# Patient Record
Sex: MALE | Race: WHITE
[De-identification: names, ages, dates, MRNs, and addresses within clinical notes are randomized per-mention and may not be internally consistent; named-entity substitution may affect disease eponyms.]

---

## 2018-03-06 ENCOUNTER — HOSPITAL ENCOUNTER (OUTPATIENT)
Dept: HOSPITAL 76 - RT.N | Age: 63
Discharge: HOME | End: 2018-03-06
Attending: NURSE PRACTITIONER
Payer: MEDICAID

## 2018-03-06 DIAGNOSIS — R07.89: Primary | ICD-10-CM

## 2018-03-06 DIAGNOSIS — I10: ICD-10-CM

## 2018-03-06 PROCEDURE — 93005 ELECTROCARDIOGRAM TRACING: CPT

## 2018-04-04 ENCOUNTER — HOSPITAL ENCOUNTER (OUTPATIENT)
Dept: HOSPITAL 76 - EMS | Age: 63
Discharge: ANOTHER HOSPITAL | End: 2018-04-04
Attending: SURGERY
Payer: MEDICAID

## 2018-04-04 ENCOUNTER — HOSPITAL ENCOUNTER (OUTPATIENT)
Dept: HOSPITAL 76 - EMS | Age: 63
Discharge: HOME | End: 2018-04-04
Attending: SURGERY
Payer: MEDICAID

## 2018-04-04 ENCOUNTER — HOSPITAL ENCOUNTER (EMERGENCY)
Dept: HOSPITAL 76 - ED | Age: 63
Discharge: ANOTHER HOSPITAL | End: 2018-04-04
Payer: MEDICAID

## 2018-04-04 VITALS — DIASTOLIC BLOOD PRESSURE: 66 MMHG | SYSTOLIC BLOOD PRESSURE: 133 MMHG
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
## 2018-04-04 DIAGNOSIS — G40.909: Primary | ICD-10-CM
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
## 2018-04-04 DIAGNOSIS — M54.5: ICD-10-CM
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
## 2018-04-04 DIAGNOSIS — M54.6: ICD-10-CM

## 2018-04-04 DIAGNOSIS — S02.402A: ICD-10-CM

## 2018-04-04 DIAGNOSIS — S22.39XA: ICD-10-CM

## 2018-04-04 DIAGNOSIS — S01.111A: ICD-10-CM

## 2018-04-04 DIAGNOSIS — S82.831A: ICD-10-CM

## 2018-04-04 DIAGNOSIS — S22.31XA: ICD-10-CM

## 2018-04-04 DIAGNOSIS — Z23: ICD-10-CM

## 2018-04-04 DIAGNOSIS — S01.81XA: Primary | ICD-10-CM

## 2018-04-04 DIAGNOSIS — S02.92XA: Primary | ICD-10-CM

## 2018-04-04 PROCEDURE — 99284 EMERGENCY DEPT VISIT MOD MDM: CPT

## 2018-04-04 PROCEDURE — 12011 RPR F/E/E/N/L/M 2.5 CM/<: CPT

## 2018-04-04 PROCEDURE — 72125 CT NECK SPINE W/O DYE: CPT

## 2018-04-04 PROCEDURE — 70450 CT HEAD/BRAIN W/O DYE: CPT

## 2018-04-04 PROCEDURE — 72100 X-RAY EXAM L-S SPINE 2/3 VWS: CPT

## 2018-04-04 PROCEDURE — 90471 IMMUNIZATION ADMIN: CPT

## 2018-04-04 PROCEDURE — 71046 X-RAY EXAM CHEST 2 VIEWS: CPT

## 2018-04-04 PROCEDURE — 70486 CT MAXILLOFACIAL W/O DYE: CPT

## 2018-04-04 NOTE — XRAY PRELIMINARY REPORT
Accession: [id number]

Exam: XR SHOULDER 3 VIEW RT

 

IMPRESSION: Right second rib fracture.

 

[facility]

 

SITE ID: [id number]

## 2018-04-04 NOTE — XRAY PRELIMINARY REPORT
Accession: [id number]

Exam: XR LUMBAR SPINE 2 VIEW

 

IMPRESSION: 

1. No acute bony abnormality. 

2. Old moderate wedging thoracolumbar junction with diffuse idiopathic skeletal hyperostosis.

 

[facility]

 

SITE ID: [id number]
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
## 2018-04-04 NOTE — XRAY PRELIMINARY REPORT
Accession: [id number]

Exam: XR TIB/FIB RT

 

IMPRESSION: Proximal fibular fracture.

 

[facility]

 

SITE ID: [id number]
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
## 2018-04-04 NOTE — XRAY PRELIMINARY REPORT
Accession: [id number]

Exam: XR HIP W/PELVIS 2-3V RT

 

IMPRESSION: 

1. No acute bony abnormality. 

2. Mild degenerative changes both hips.

 

[facility]

 

SITE ID: [id number]

## 2018-04-04 NOTE — CT PRELIMINARY REPORT
Accession: [id number]

Exam: CT CERVICAL SPINE W/O

 

IMPRESSION: 

1. No acute abnormality of the cervical spine with degenerative spondylosis as above.

2. Minimally displaced right styloid process fracture.

 

[facility]

 

SITE ID: [id number]
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
## 2018-04-04 NOTE — CT PRELIMINARY REPORT
Accession: [id number]

Exam: CT HEAD W/O

 

IMPRESSION: No acute intracranial abnormality. Please refer to separately dictated facial CT for desc
ription of facial fracture.

 

[facility]

 

SITE ID: [id number]
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
## 2018-04-04 NOTE — CT PRELIMINARY REPORT
Accession: [id number]

Exam: CT FACIAL BONES W/O

 

IMPRESSION: 

1. Findings most consistent with a right zygomaticomaxillary complex fracture (tripod fracture), mini
anthony displaced with sutural widening, though minimally depressed segmental zygomatic arch fracture i
s a component of this.

2. No evidence of intraorbital abnormality or herniated contents through the floor of the right orbit
 at the site of minimally displaced orbital floor fracture.

3. Minimally displaced right styloid process fracture.

 

[facility]

 

The above findings  were discussed with Dr. Solis by Dr. Dayne Steve at 16:37 hrs on 4/4/18.

 

SITE ID: [id number]

## 2018-05-25 ENCOUNTER — HOSPITAL ENCOUNTER (OUTPATIENT)
Dept: HOSPITAL 76 - DI.N | Age: 63
Discharge: HOME | End: 2018-05-25
Attending: FAMILY MEDICINE
Payer: MEDICAID

## 2018-05-25 DIAGNOSIS — M25.40: Primary | ICD-10-CM

## 2018-05-25 NOTE — XRAY REPORT
TWO VIEW RIGHT FOREARM:  05/25/2018

 

CLINICAL INDICATION:  Swelling.

 

FINDINGS:  Frontal and lateral views of the right forearm demonstrate no evidence of fracture. 

No foreign body is seen.

 

IMPRESSION:  NORMAL RIGHT FOREARM.

 

 

DD: 05/25/2018 15:20

TD: 05/25/2018 15:23

Job #: [id number]

## 2018-05-25 NOTE — XRAY REPORT
THREE-VIEW BILATERAL HANDS:  05/25/2018

 

CLINICAL INDICATION:  Joint swelling.

 

FINDINGS:  AP, lateral, and oblique views of the bilateral hands demonstrate no evidence of 

fracture or dislocation.  The joint spaces are preserved.  No radiopaque foreign body is seen 

in the soft tissues.

 

IMPRESSION:  NORMAL BILATERAL HANDS.

 

 

DD: 05/25/2018 15:19

TD: 05/25/2018 15:24

Job #: [id number]

## 2018-09-27 ENCOUNTER — HOSPITAL ENCOUNTER (OUTPATIENT)
Dept: HOSPITAL 76 - DI.N | Age: 63
Discharge: HOME | End: 2018-09-27
Attending: NURSE PRACTITIONER
Payer: MEDICAID

## 2018-09-27 ENCOUNTER — HOSPITAL ENCOUNTER (OUTPATIENT)
Dept: HOSPITAL 76 - RT.N | Age: 63
Discharge: HOME | End: 2018-09-27
Attending: NURSE PRACTITIONER
Payer: MEDICAID

## 2018-09-27 DIAGNOSIS — M79.601: Primary | ICD-10-CM

## 2018-09-27 DIAGNOSIS — R42: Primary | ICD-10-CM

## 2018-09-27 PROCEDURE — 93005 ELECTROCARDIOGRAM TRACING: CPT
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
## 2018-11-28 ENCOUNTER — HOSPITAL ENCOUNTER (EMERGENCY)
Dept: HOSPITAL 76 - ED | Age: 63
Discharge: HOME | End: 2018-11-28
Payer: MEDICAID

## 2018-11-28 VITALS — DIASTOLIC BLOOD PRESSURE: 89 MMHG | SYSTOLIC BLOOD PRESSURE: 148 MMHG

## 2018-11-28 DIAGNOSIS — I44.7: ICD-10-CM

## 2018-11-28 DIAGNOSIS — E78.00: ICD-10-CM

## 2018-11-28 DIAGNOSIS — Z95.5: ICD-10-CM

## 2018-11-28 DIAGNOSIS — I25.118: Primary | ICD-10-CM

## 2018-11-28 LAB
ALBUMIN DIAFP-MCNC: 4.5 G/DL (ref 3.2–5.5)
ALBUMIN/GLOB SERPL: 1.6 {RATIO} (ref 1–2.2)
ALP SERPL-CCNC: 61 IU/L (ref 42–121)
ALT SERPL W P-5'-P-CCNC: 18 IU/L (ref 10–60)
ANION GAP SERPL CALCULATED.4IONS-SCNC: 7 MMOL/L (ref 6–13)
AST SERPL W P-5'-P-CCNC: 26 IU/L (ref 10–42)
BASOPHILS NFR BLD AUTO: 0.1 10^3/UL (ref 0–0.1)
BASOPHILS NFR BLD AUTO: 1 %
BILIRUB BLD-MCNC: 0.5 MG/DL (ref 0.2–1)
BUN SERPL-MCNC: 23 MG/DL (ref 6–20)
CALCIUM UR-MCNC: 9 MG/DL (ref 8.5–10.3)
CHLORIDE SERPL-SCNC: 104 MMOL/L (ref 101–111)
CO2 SERPL-SCNC: 26 MMOL/L (ref 21–32)
CREAT SERPLBLD-SCNC: 1 MG/DL (ref 0.6–1.2)
EOSINOPHIL # BLD AUTO: 0.4 10^3/UL (ref 0–0.7)
EOSINOPHIL NFR BLD AUTO: 6.8 %
ERYTHROCYTE [DISTWIDTH] IN BLOOD BY AUTOMATED COUNT: 12.6 % (ref 12–15)
GFRSERPLBLD MDRD-ARVRAT: 76 ML/MIN/{1.73_M2} (ref 89–?)
GLOBULIN SER-MCNC: 2.9 G/DL (ref 2.1–4.2)
GLUCOSE SERPL-MCNC: 95 MG/DL (ref 70–100)
HGB UR QL STRIP: 15.5 G/DL (ref 14–18)
LIPASE SERPL-CCNC: 43 U/L (ref 22–51)
LYMPHOCYTES # SPEC AUTO: 1.1 10^3/UL (ref 1.5–3.5)
LYMPHOCYTES NFR BLD AUTO: 20.1 %
MAGNESIUM SERPL-MCNC: 2.2 MG/DL (ref 1.7–2.8)
MCH RBC QN AUTO: 32.8 PG (ref 27–31)
MCHC RBC AUTO-ENTMCNC: 35.2 G/DL (ref 32–36)
MCV RBC AUTO: 93.3 FL (ref 80–94)
MONOCYTES # BLD AUTO: 0.7 10^3/UL (ref 0–1)
MONOCYTES NFR BLD AUTO: 13.7 %
NEUTROPHILS # BLD AUTO: 3.1 10^3/UL (ref 1.5–6.6)
NEUTROPHILS # SNV AUTO: 5.2 X10^3/UL (ref 4.8–10.8)
NEUTROPHILS NFR BLD AUTO: 58.4 %
PDW BLD AUTO: 8.1 FL (ref 7.4–11.4)
PLATELET # BLD: 201 10^3/UL (ref 130–450)
PROT SPEC-MCNC: 7.4 G/DL (ref 6.7–8.2)
RBC MAR: 4.72 10^6/UL (ref 4.7–6.1)
SODIUM SERPLBLD-SCNC: 137 MMOL/L (ref 135–145)

## 2018-11-28 PROCEDURE — 80053 COMPREHEN METABOLIC PANEL: CPT

## 2018-11-28 PROCEDURE — 99284 EMERGENCY DEPT VISIT MOD MDM: CPT

## 2018-11-28 PROCEDURE — 96374 THER/PROPH/DIAG INJ IV PUSH: CPT

## 2018-11-28 PROCEDURE — 84484 ASSAY OF TROPONIN QUANT: CPT

## 2018-11-28 PROCEDURE — 93005 ELECTROCARDIOGRAM TRACING: CPT

## 2018-11-28 PROCEDURE — 83690 ASSAY OF LIPASE: CPT

## 2018-11-28 PROCEDURE — 83735 ASSAY OF MAGNESIUM: CPT

## 2018-11-28 PROCEDURE — 85025 COMPLETE CBC W/AUTO DIFF WBC: CPT

## 2018-11-28 PROCEDURE — 83880 ASSAY OF NATRIURETIC PEPTIDE: CPT

## 2018-11-28 PROCEDURE — 36415 COLL VENOUS BLD VENIPUNCTURE: CPT

## 2018-11-28 NOTE — ED PHYSICIAN DOCUMENTATION
PD HPI CHEST PAIN





- Stated complaint


Stated Complaint: CHEST PX





- Chief complaint


Chief Complaint: Cardiac





- History obtained from


History obtained from: Patient





- History of Present Illness


Timing - onset: Today


Timing - onset during: Exertion (The patient had heart stents in July and had 

been doing cardiac rehab at a lower level.  He had increased his activity to 5-6

METs the last 2 sessions and has had a little bit of chest pain with both of 

those.  He also noted some chest pain with walking up inclined hill this past 

week.  He has not had any discomfort with light activity.He was in cardiac rehab

today when he developed some exertional discomfort in the chest and his 

telemetry showed a change from a narrow complex to a bundle branch and then 

returned back to narrow complex.  He was sent to the ER.  They did not contact 

his cardiologist to head.)


Timing - details: Abrupt onset, Now resolved (resolved with stopping the 

exercise.)


Quality: Tightness, Aching


Location: Substernal


Associated symptoms: No: Shortness of air, Diaphoresis, Nausea, Feeling faint / 

dizzy, Palpitations


Similar symptoms before: Diagnosis (angina prior to heart stents in July)


Recently seen: Clinic (cardiac rehab)





Review of Systems


Constitutional: denies: Fever, Chills


Nose: denies: Rhinorrhea / runny nose, Congestion


Throat: denies: Sore throat


Cardiac: reports: Chest pain / pressure (with exertion the past 1-2 weeks).  

denies: Palpitations, Pedal edema, Calf pain


Respiratory: denies: Dyspnea, Cough


GI: denies: Abdominal Pain, Nausea, Vomiting, Diarrhea


Skin: denies: Rash, Lesions


Neurologic: denies: Generalized weakness, Focal weakness, Numbness, Near syncope





PD PAST MEDICAL HISTORY





- Past Medical History


Cardiovascular: High cholesterol





- Past Surgical History


Past Surgical History: No





- Present Medications


Home Medications: 


                                Ambulatory Orders











 Medication  Instructions  Recorded  Confirmed


 


Levetiracetam [Keppra] 2 tab PO BID 04/04/18 04/04/18


 


Lisinopril 0 mg PO DAILY 04/04/18 04/04/18


 


Pravastatin [Pravachol] 0 mg QPM 04/04/18 04/04/18


 


carBAMazepine ER [TEGretol XR] TID 04/04/18 


 


Amlodipine Besylate [Norvasc] 10 mg PO DAILY #20 tablet 11/28/18 


 


Nitroglycerin 0.4 mg SL PRN PRN #1 bottle 11/28/18 














- Allergies


Allergies/Adverse Reactions: 


                                    Allergies











Allergy/AdvReac Type Severity Reaction Status Date / Time


 


dextroamphetamine Allergy  Edema Verified 11/28/18 14:33





[From Dexedrine]     


 


Penicillins Allergy  Unknown Verified 11/28/18 14:33














- Social History


Does the pt smoke?: No


Smoking Status: Never smoker


Does the pt drink ETOH?: No


Does the pt have substance abuse?: No





- Immunizations


Immunizations are current?: No


Immunizations: TDAP >10years/unknown





PD ED PE NORMAL





- Vitals


Vital signs reviewed: Yes





- General


General: Alert and oriented X 3, No acute distress, Well developed/nourished





- HEENT


HEENT: Pharynx benign





- Neck


Neck: Supple, no meningeal sign, No adenopathy





- Cardiac


Cardiac: RRR, No murmur





- Respiratory


Respiratory: Clear bilaterally





- Abdomen


Abdomen: Soft, Non tender





- Derm


Derm: Normal color, Warm and dry





- Extremities


Extremities: No deformity, No tenderness to palpate, Normal ROM s pain, No 

edema, No calf tenderness / cord





- Neuro


Neuro: Alert and oriented X 3, No motor deficit, Normal speech





Results





- Vitals


Vitals: 


                               Vital Signs - 24 hr











  11/28/18 11/28/18 11/28/18





  14:31 15:12 15:30


 


Temperature 36.7 C  


 


Heart Rate 85 57 L 58 L


 


Respiratory 16 12 15





Rate   


 


Blood Pressure 184/101 H 161/90 H 148/89 H


 


O2 Saturation 97 98 














  11/28/18 11/28/18





  16:00 16:30


 


Temperature  


 


Heart Rate 62 59 L


 


Respiratory 18 13





Rate  


 


Blood Pressure  


 


O2 Saturation 97 97








                                     Oxygen











O2 Source                      Room air

















- EKG (time done)


  ** 14:34


Rate: Rate (enter#) (79)


Rhythm: NSR


Axis: Normal


Intervals: Normal OH


QRS: Normal


Ischemia: Normal ST segments.  No: ST elevation c/w ischemia, ST depression





- Labs


Labs: 


                                Laboratory Tests











  11/28/18 11/28/18 11/28/18





  11:45 14:45 14:45


 


WBC  5.2  


 


RBC  4.72  


 


Hgb  15.5  


 


Hct  44.0  


 


MCV  93.3  


 


MCH  32.8 H  


 


MCHC  35.2  


 


RDW  12.6  


 


Plt Count  201  


 


MPV  8.1  


 


Neut # (Auto)  3.1  


 


Lymph # (Auto)  1.1 L  


 


Mono # (Auto)  0.7  


 


Eos # (Auto)  0.4  


 


Baso # (Auto)  0.1  


 


Absolute Nucleated RBC  0.00  


 


Nucleated RBC %  0.0  


 


Sodium   137 


 


Potassium   4.1 


 


Chloride   104 


 


Carbon Dioxide   26 


 


Anion Gap   7.0 


 


BUN   23 H 


 


Creatinine   1.0 


 


Estimated GFR (MDRD)   76 L 


 


Glucose   95 


 


Calcium   9.0 


 


Magnesium   2.2 


 


Total Bilirubin   0.5 


 


AST   26 


 


ALT   18 


 


Alkaline Phosphatase   61 


 


Troponin I    < 0.04


 


B-Natriuretic Peptide   


 


Total Protein   7.4 


 


Albumin   4.5 


 


Globulin   2.9 


 


Albumin/Globulin Ratio   1.6 


 


Lipase   43 














  11/28/18





  14:45


 


WBC 


 


RBC 


 


Hgb 


 


Hct 


 


MCV 


 


MCH 


 


MCHC 


 


RDW 


 


Plt Count 


 


MPV 


 


Neut # (Auto) 


 


Lymph # (Auto) 


 


Mono # (Auto) 


 


Eos # (Auto) 


 


Baso # (Auto) 


 


Absolute Nucleated RBC 


 


Nucleated RBC % 


 


Sodium 


 


Potassium 


 


Chloride 


 


Carbon Dioxide 


 


Anion Gap 


 


BUN 


 


Creatinine 


 


Estimated GFR (MDRD) 


 


Glucose 


 


Calcium 


 


Magnesium 


 


Total Bilirubin 


 


AST 


 


ALT 


 


Alkaline Phosphatase 


 


Troponin I 


 


B-Natriuretic Peptide  8


 


Total Protein 


 


Albumin 


 


Globulin 


 


Albumin/Globulin Ratio 


 


Lipase 














PD MEDICAL DECISION MAKING





- ED course


Complexity details: reviewed results, considered differential (He had 

exertionally related chest discomfort which improved with rest.  This sounds 

like stable angina subsequent to his heart caths so that is concerning if baby 

is having restenosis or new area.  His telemetry from cardiac rehab showed an 

intermittent brief bundle branch block.  I talked with cardiology on-call who 

said he does have a history of a left bundle intermittently.  This does not need

any particular attention.  We will prescribe the patient some Norvasc and 

nitroglycerin as needed and have him follow-up with cardiology.), d/w patient, 

d/w consultant (Cradiology on call for Dr. Disla - directed start Norvasc (since

resting heart rate 60, not to do beta blocker), and NTG PRN. F/U with 

Cardiology. )





Departure





- Departure


Disposition: 01 Home, Self Care


Clinical Impression: 


 Coronary artery disease with exertional angina, Bundle branch block, left





Condition: Stable


Record reviewed to determine appropriate education?: Yes


Instructions:  ED Chest Pain Angina Stable


Follow-Up: 


Mihaela Blackwell ARNP [Primary Care Provider] - 


Ryan Macario MD [Physician No Access] - 


Prescriptions: 


Amlodipine Besylate [Norvasc] 10 mg PO DAILY #20 tablet


Nitroglycerin 0.4 mg SL PRN PRN #1 bottle


 PRN Reason: Chest Pain


Comments: 


Continue your other medications.  Add Norvasc 10 mg daily to help reduce your 

blood pressure and help with blood flow to the heart.  Light activity is okay 

but do not do activity to the level that causes chest discomfort.  You can 

continue to cardiac rehab about a low level until follow-up.  He is 

nitroglycerin under the tongue if needed for chest pain that is not relieved by 

rest.  Call the cardiology office tomorrow for follow-up appointment which will 

likely be for next week.  They will want to do some heart testing to evaluate 

the patency of the stents for such.  Return if persistent chest pain not 

relieved by rest or nitroglycerin.


Discharge Date/Time: 11/28/18 16:50

## 2018-12-28 ENCOUNTER — HOSPITAL ENCOUNTER (EMERGENCY)
Dept: HOSPITAL 76 - ED | Age: 63
Discharge: HOME | End: 2018-12-28
Payer: MEDICAID

## 2018-12-28 VITALS — SYSTOLIC BLOOD PRESSURE: 170 MMHG | DIASTOLIC BLOOD PRESSURE: 87 MMHG

## 2018-12-28 DIAGNOSIS — L30.9: Primary | ICD-10-CM

## 2018-12-28 DIAGNOSIS — E78.00: ICD-10-CM

## 2018-12-28 DIAGNOSIS — Z95.5: ICD-10-CM

## 2018-12-28 PROCEDURE — 99283 EMERGENCY DEPT VISIT LOW MDM: CPT

## 2018-12-28 NOTE — ED PHYSICIAN DOCUMENTATION
PD HPI SKIN





- Stated complaint


Stated Complaint: RASH ON HIP





- Chief complaint


Chief Complaint: Wound





- History obtained from


History obtained from: Patient





- History of Present Illness


Timing - onset: How many days ago (5)


Timing - duration: Days (5)


Timing - details: Gradual onset


Pain level max: 0


Pain level now: 0


Quality / character: Itchy.  No: Painful, Burning, Discolored, Raised, 

Vesicular, Crusted, Swelling, Draining


Improved by: Other (nothing)


Worsened by (comment): COMMENT (nothing)


Associated symptoms: No: Fever, Myalgias, Joint pain, Headache, Facial swelling,

Dyspnea, Abd pain, N/V/D, Urinary sx


Contributing factors: No: Exposed to medication, Exposed to food, Exposed to 

soap / lotion, Exposed to Poison ivy/oak, Insect bite /sting, Recent illness


Similar symptoms before: Has not had sx before


Recently seen: Not recently seen





- Additional information


Additional information: 





Patient with a rash to the right side of his trunk and hip for the past 5 days. 

It is itchy.





Review of Systems


Constitutional: denies: Fever, Chills


GI: denies: Vomiting


Musculoskeletal: denies: Neck pain, Back pain


Neurologic: denies: Headache





PD PAST MEDICAL HISTORY





- Past Medical History


Cardiovascular: High cholesterol


Neuro: Seizure disorder





- Past Surgical History


Past Surgical History: No


Cardiovascular: Coronary stent





- Present Medications


Home Medications: 


                                Ambulatory Orders











 Medication  Instructions  Recorded  Confirmed


 


Levetiracetam [Keppra] 2 tab PO BID 04/04/18 04/04/18


 


Lisinopril 5 mg PO DAILY 04/04/18 04/04/18


 


carBAMazepine ER [TEGretol XR] TID 04/04/18 


 


Amlodipine Besylate [Norvasc] 10 mg PO DAILY #20 tablet 11/28/18 


 


Nitroglycerin 0.4 mg SL PRN PRN #1 bottle 11/28/18 


 


Aspirin Chewable [[place]  12/28/18 





Aspirin]   


 


Atorvastatin Calcium  12/28/18 


 


Calcium Carbonate [Calcium]  12/28/18 


 


Clopidogrel [Plavix]  12/28/18 


 


Isosorbide Dinitrate  12/28/18 


 


Lacosamide [Vimpat]  12/28/18 


 


predniSONE [Prednisone] 40 mg PO DAILY #10 tablet 12/28/18 














- Allergies


Allergies/Adverse Reactions: 


                                    Allergies











Allergy/AdvReac Type Severity Reaction Status Date / Time


 


dextroamphetamine Allergy  Edema Verified 12/28/18 11:46





[From Dexedrine]     


 


Penicillins Allergy  Unknown Verified 12/28/18 11:46














- Social History


Does the pt smoke?: No


Smoking Status: Never smoker


Does the pt drink ETOH?: No


Does the pt have substance abuse?: No





- Immunizations


Immunizations are current?: No


Immunizations: TDAP >10years/unknown





PD ED PE NORMAL





- Vitals


Vital signs reviewed: Yes





- General


General: Alert and oriented X 3, No acute distress





- Derm


Derm: Warm and dry





- Extremities


Extremities: Other (2 small areas of papular exanthem to the right trunk, one is

approximately 2 x 3 cm, the other is 3 x 3 cm.  Blanches easily.)





- Neuro


Neuro: Alert and oriented X 3





Results





- Vitals


Vitals: 





                               Vital Signs - 24 hr











  12/28/18





  11:40


 


Temperature 36.0 C L


 


Heart Rate 60


 


Respiratory 16





Rate 


 


Blood Pressure 170/87 H


 


O2 Saturation 97








                                     Oxygen











O2 Source                      Room air

















PD MEDICAL DECISION MAKING





- ED course


Complexity details: considered differential, d/w patient


ED course: 





63-year-old male with a nonspecific dermatitis.  No evidence of infection.  Will

place on steroids.  We will have him follow-up with his doctor.  Patient 

counseled regarding signs and symptoms for which I believe and urgent re-

evaluation would be necessary. Patient with good understanding of and agreement 

to plan and is comfortable going home at this time





This document was made in part using voice recognition software. While efforts 

are made to proofread this document, sound alike and grammatical errors may 

occur.





Departure





- Departure


Disposition: 01 Home, Self Care


Clinical Impression: 


 Dermatitis





Condition: Good


Instructions:  ED Dermatitis Non Specific Rash


Follow-Up: 


Mihaela Blackwell ARNP [Primary Care Provider] - Within 1 week (for recheck)


Prescriptions: 


predniSONE [Prednisone] 40 mg PO DAILY #10 tablet


Comments: 


Use the steroids as prescribed.  Return if you worsen.  Follow-up with your 

doctor for further care.  The cause of your rash is unclear today.

## 2019-01-22 ENCOUNTER — HOSPITAL ENCOUNTER (EMERGENCY)
Dept: HOSPITAL 76 - ED | Age: 64
Discharge: HOME | End: 2019-01-22
Payer: MEDICAID

## 2019-01-22 VITALS — SYSTOLIC BLOOD PRESSURE: 160 MMHG | DIASTOLIC BLOOD PRESSURE: 91 MMHG

## 2019-01-22 DIAGNOSIS — Z79.01: ICD-10-CM

## 2019-01-22 DIAGNOSIS — Z95.5: ICD-10-CM

## 2019-01-22 DIAGNOSIS — Z79.82: ICD-10-CM

## 2019-01-22 DIAGNOSIS — B35.4: Primary | ICD-10-CM

## 2019-01-22 DIAGNOSIS — E78.00: ICD-10-CM

## 2019-01-22 PROCEDURE — 99283 EMERGENCY DEPT VISIT LOW MDM: CPT

## 2019-01-22 NOTE — ED PHYSICIAN DOCUMENTATION
PD HPI SKIN





- Stated complaint


Stated Complaint: RASH ON BACK/LEFT FORE ARM





- Chief complaint


Chief Complaint: Wound





- History obtained from


History obtained from: Patient





- History of Present Illness


Timing - onset: Other (Very itchy all over for the last 2 weeks and has noticed 

a rash in that time on his back and now on the left forearm.  He is never had 

this before.  Has never seen a dermatologist for any reason.  He denies joint 

aches.  No fevers chills or weight loss.)





Review of Systems


Constitutional: denies: Fever, Chills


Respiratory: denies: Dyspnea, Cough


GI: denies: Abdominal Pain, Nausea





PD PAST MEDICAL HISTORY





- Past Medical History


Cardiovascular: High cholesterol


Respiratory: None


Neuro: Seizure disorder


Endocrine/Autoimmune: None


GI: None


: None


Psych: None


Musculoskeletal: None


Derm: None





- Past Surgical History


Past Surgical History: Yes


Cardiovascular: Coronary stent





- Present Medications


Home Medications: 


                                Ambulatory Orders











 Medication  Instructions  Recorded  Confirmed


 


Levetiracetam [Keppra] 2 tab PO BID 04/04/18 04/04/18


 


Lisinopril 5 mg PO DAILY 04/04/18 04/04/18


 


carBAMazepine ER [TEGretol XR] TID 04/04/18 


 


Amlodipine Besylate [Norvasc] 10 mg PO DAILY #20 tablet 11/28/18 


 


Nitroglycerin 0.4 mg SL PRN PRN #1 bottle 11/28/18 


 


Aspirin Chewable [[place]  12/28/18 





Aspirin]   


 


Atorvastatin Calcium 80 mg QPM 12/28/18 


 


Calcium Carbonate [Calcium] 1 tab DAILY 12/28/18 


 


Clopidogrel [Plavix] 75 mg DAILY 12/28/18 


 


Isosorbide Dinitrate 30 mg DAILY 12/28/18 


 


Lacosamide [Vimpat] 50 mg QPM 12/28/18 


 


Clotrimazole/Betamethasone Dip 1 appful TP TID #3 cream..g. 01/22/19 





[Clotrimazole-Betamethasone Crm]   


 


hydrOXYzine pamoate [Hydroxyzine 1 - 2 tab PO Q6H PRN #20 capsule 01/22/19 





Pamoate]   














- Allergies


Allergies/Adverse Reactions: 


                                    Allergies











Allergy/AdvReac Type Severity Reaction Status Date / Time


 


dextroamphetamine Allergy  Edema Verified 01/22/19 11:13





[From Dexedrine]     


 


Penicillins Allergy  Unknown Verified 01/22/19 11:13














- Social History


Does the pt smoke?: No


Smoking Status: Never smoker


Does the pt drink ETOH?: No


Does the pt have substance abuse?: No





- Immunizations


Immunizations are current?: No


Immunizations: TDAP >10years/unknown





- POLST


Patient has POLST: No





PD ED PE NORMAL





- Vitals


Vital signs reviewed: Yes





- General


General: Alert and oriented X 3, No acute distress





- Derm


Derm: Other (There is a macular rash especially on the back but also a spot on 

the left forearm with slight central clearing, this is more likely consistent wi

th tinea and less likely pityriasis rosea.)





- Neuro


Neuro: Alert and oriented X 3, Normal speech





Results





- Vitals


Vitals: 





                               Vital Signs - 24 hr











  01/22/19





  11:04


 


Temperature 36.3 C L


 


Heart Rate 66


 


Respiratory 18





Rate 


 


Blood Pressure 149/96 H


 


O2 Saturation 98








                                     Oxygen











O2 Source                      Room air

















Departure





- Departure


Disposition: 01 Home, Self Care


Clinical Impression: 


 Tinea corporis





Condition: Good


Record reviewed to determine appropriate education?: Yes


Instructions:  ED Infec Skin Fungal Tinea


Follow-Up: 


[facility] [Provider Group]


Prescriptions: 


Clotrimazole/Betamethasone Dip [Clotrimazole-Betamethasone Crm] 1 appful TP TID 

#3 cream..g.


hydrOXYzine pamoate [Hydroxyzine Pamoate] 1 - 2 tab PO Q6H PRN #20 capsule


 PRN Reason: Itching


Comments: 


As discussed, I think this most likely represents a fungal infection which 

should be treated by the cream I am giving you.  That said other possibilities 

include pityriasis rosea.  Either way he should follow-up with a dermatologist 

for definitive diagnosis and treatment.  Call the number on this form.

## 2019-03-15 ENCOUNTER — HOSPITAL ENCOUNTER (OUTPATIENT)
Dept: HOSPITAL 76 - LAB.N | Age: 64
Discharge: HOME | End: 2019-03-15
Payer: MEDICAID

## 2019-03-15 DIAGNOSIS — G40.219: Primary | ICD-10-CM

## 2019-03-15 LAB — CARBAMAZEPINE SERPL-MSCNC: 9.8 UG/ML

## 2019-03-15 PROCEDURE — 80156 ASSAY CARBAMAZEPINE TOTAL: CPT

## 2019-03-15 PROCEDURE — 36415 COLL VENOUS BLD VENIPUNCTURE: CPT

## 2020-04-24 ENCOUNTER — HOSPITAL ENCOUNTER (OUTPATIENT)
Dept: HOSPITAL 76 - LAB | Age: 65
Discharge: HOME | End: 2020-04-24
Attending: INTERNAL MEDICINE
Payer: COMMERCIAL

## 2020-04-24 DIAGNOSIS — I10: Primary | ICD-10-CM

## 2020-04-24 DIAGNOSIS — E78.2: ICD-10-CM

## 2020-04-24 DIAGNOSIS — Z95.5: ICD-10-CM

## 2020-04-24 LAB
ANION GAP SERPL CALCULATED.4IONS-SCNC: 8 MMOL/L (ref 6–13)
BASOPHILS NFR BLD AUTO: 0.1 10^3/UL (ref 0–0.1)
BASOPHILS NFR BLD AUTO: 0.7 %
BUN SERPL-MCNC: 27 MG/DL (ref 6–20)
CALCIUM UR-MCNC: 9.4 MG/DL (ref 8.5–10.3)
CHLORIDE SERPL-SCNC: 107 MMOL/L (ref 101–111)
CHOLEST SERPL-MCNC: 129 MG/DL
CO2 SERPL-SCNC: 27 MMOL/L (ref 21–32)
CREAT SERPLBLD-SCNC: 1.3 MG/DL (ref 0.6–1.2)
EOSINOPHIL # BLD AUTO: 0.3 10^3/UL (ref 0–0.7)
EOSINOPHIL NFR BLD AUTO: 4.5 %
ERYTHROCYTE [DISTWIDTH] IN BLOOD BY AUTOMATED COUNT: 11.8 % (ref 12–15)
GLUCOSE SERPL-MCNC: 106 MG/DL (ref 70–100)
HDLC SERPL-MCNC: 49 MG/DL
HDLC SERPL: 2.6 {RATIO} (ref ?–5)
HGB UR QL STRIP: 14.7 G/DL (ref 14–18)
LDLC SERPL CALC-MCNC: 70 MG/DL
LDLC/HDLC SERPL: 1.4 {RATIO} (ref ?–3.6)
LYMPHOCYTES # SPEC AUTO: 1.1 10^3/UL (ref 1.5–3.5)
LYMPHOCYTES NFR BLD AUTO: 14.6 %
MCH RBC QN AUTO: 33 PG (ref 27–31)
MCHC RBC AUTO-ENTMCNC: 34.8 G/DL (ref 32–36)
MCV RBC AUTO: 94.6 FL (ref 80–94)
MONOCYTES # BLD AUTO: 0.7 10^3/UL (ref 0–1)
MONOCYTES NFR BLD AUTO: 9.4 %
NEUTROPHILS # BLD AUTO: 5.2 10^3/UL (ref 1.5–6.6)
NEUTROPHILS # SNV AUTO: 7.3 X10^3/UL (ref 4.8–10.8)
NEUTROPHILS NFR BLD AUTO: 70.3 %
PDW BLD AUTO: 9.8 FL (ref 7.4–11.4)
PLATELET # BLD: 178 10^3/UL (ref 130–450)
RBC MAR: 4.46 10^6/UL (ref 4.7–6.1)
SODIUM SERPLBLD-SCNC: 142 MMOL/L (ref 135–145)
VLDLC SERPL-SCNC: 10 MG/DL

## 2020-04-24 PROCEDURE — 36415 COLL VENOUS BLD VENIPUNCTURE: CPT

## 2020-04-24 PROCEDURE — 80048 BASIC METABOLIC PNL TOTAL CA: CPT

## 2020-04-24 PROCEDURE — 80061 LIPID PANEL: CPT

## 2020-04-24 PROCEDURE — 85025 COMPLETE CBC W/AUTO DIFF WBC: CPT

## 2020-04-24 PROCEDURE — 83721 ASSAY OF BLOOD LIPOPROTEIN: CPT

## 2021-01-07 ENCOUNTER — HOSPITAL ENCOUNTER (EMERGENCY)
Dept: HOSPITAL 76 - ED | Age: 66
Discharge: HOME | End: 2021-01-07
Payer: MEDICARE

## 2021-01-07 VITALS — DIASTOLIC BLOOD PRESSURE: 66 MMHG | SYSTOLIC BLOOD PRESSURE: 100 MMHG

## 2021-01-07 DIAGNOSIS — Z79.82: ICD-10-CM

## 2021-01-07 DIAGNOSIS — M79.662: Primary | ICD-10-CM

## 2021-01-07 PROCEDURE — 99283 EMERGENCY DEPT VISIT LOW MDM: CPT

## 2021-01-07 NOTE — XRAY REPORT
PROCEDURE:  Tib/Fib LT

 

INDICATIONS:  pain

 

TECHNIQUE:  2 views of the tibia and fibula were acquired.  

 

COMPARISON:  None

 

FINDINGS:  

 

Bones:  No fractures or dislocations.  No suspicious bony lesions.  

 

Soft tissues:  No suspicious soft tissue calcifications or masses.  

 

IMPRESSION:  

No left lower leg fracture or dislocation. No gross soft tissue abnormality. No finding to explain pa
tient's symptoms.

 

Reviewed by: Sergey Renteria MD on 1/7/2021 11:23 AM PST

Approved by: Sergey Renteria MD on 1/7/2021 11:23 AM PST

 

 

Station ID:  [id number]

## 2021-01-07 NOTE — ED PHYSICIAN DOCUMENTATION
History of Present Illness





- Stated complaint


Stated Complaint: L LEG PX





- Chief complaint


Chief Complaint: Ext Problem





- History obtained from


History obtained from: Patient





- Additonal information


Additional information: 


Patient comes emergency department for chief complaint of left shin pain.  He 

states that he noticed a tender spot in his mid anterior tibial area on the left

and that he has a "very high pain tolerance", so if he notices any pain, 

something is "very wrong".  Patient denies trauma to the area.  No fevers or 

chills.  No redness or swelling.  He states that the area of tenderness is very 

specific.  No history of DVT, though the patient is concerned about this 

possibility.  No cancer history.  No other complaints at this time.





Review of Systems


Ten Systems: 10 systems reviewed and negative


Constitutional: reports: Reviewed and negative


Eyes: reports: Reviewed and negative


Ears: reports: Reviewed and negative


Nose: reports: Reviewed and negative


Throat: reports: Reviewed and negative


Cardiac: reports: Reviewed and negative


Respiratory: reports: Reviewed and negative


GI: reports: Reviewed and negative


: reports: Reviewed and negative


Skin: reports: Reviewed and negative


Musculoskeletal: reports: Extremity pain.  denies: Joint pain, Extremity 

swelling, Joint swelling, Pain with weight bearing


Neurologic: reports: Reviewed and negative


Psychiatric: reports: Reviewed and negative


Endocrine: reports: Reviewed and negative


Immunocompromised: reports: Reviewed and negative





PD PAST MEDICAL HISTORY





- Past Medical History


Past Medical History: Yes


Cardiovascular: High cholesterol


Respiratory: None


Neuro: Seizure disorder


Endocrine/Autoimmune: None


GI: None


: None


Psych: None


Musculoskeletal: None


Derm: None





- Past Surgical History


Past Surgical History: Yes


Cardiovascular: Coronary stent


Neuro: Other





- Present Medications


Home Medications: 


                                Ambulatory Orders











 Medication  Instructions  Recorded  Confirmed


 


Levetiracetam [Keppra] 1,500 mg PO BID 04/04/18 04/04/18


 


lisinopriL [Lisinopril] 10 mg PO QPM 04/04/18 04/04/18


 


Amlodipine Besylate [Norvasc] 10 mg PO DAILY #20 tablet 11/28/18 


 


Nitroglycerin 0.4 mg SL PRN PRN #1 bottle 11/28/18 


 


Aspirin Chewable [[place] 81 mg DAILY 12/28/18 





Aspirin]   


 


Atorvastatin Calcium 80 mg QPM 12/28/18 


 


Calcium Carbonate [Calcium] 1 tab DAILY 12/28/18 


 


Ezetimibe [Zetia] 10 mg DAILY 01/07/21 01/07/21


 


Isosorbide Mononitrate [Isosorbide 120 mg DAILY 01/07/21 01/07/21





Mononitrate ER]   


 


Ranolazine [Ranolazine ER] 500 mg BID 01/07/21 01/07/21


 


Rosuvastatin Calcium [Crestor] 40 mg QPM 01/07/21 01/07/21


 


Zonisamide [Zonegran] 50 mg QPM 01/07/21 01/07/21


 


Zonisamide [Zonegran] 200 mg QPM 01/07/21 01/07/21


 


carvediloL [Coreg] 3.125 mg BID 01/07/21 01/07/21














- Allergies


Allergies/Adverse Reactions: 


                                    Allergies











Allergy/AdvReac Type Severity Reaction Status Date / Time


 


dextroamphetamine Allergy  Edema Verified 01/22/19 11:13





[From Dexedrine]     


 


Penicillins Allergy  Unknown Verified 01/22/19 11:13


 


phenobarbital Allergy  Unknown Verified 01/07/21 10:25














- Social History


Does the pt smoke?: No


Smoking Status: Never smoker


Does the pt drink ETOH?: No


Does the pt have substance abuse?: No





- Immunizations


Immunizations are current?: No


Immunizations: TDAP >10years/unknown





- POLST


Patient has POLST: No





PD ED PE NORMAL





- Vitals


Vital signs reviewed: Yes





- General


General: Alert and oriented X 3, No acute distress, Well developed/nourished





- HEENT


HEENT: Atraumatic, PERRL, EOMI, Moist mucous membranes





- Neck


Neck: Supple, no meningeal sign





- Cardiac


Cardiac: Strong equal pulses





- Respiratory


Respiratory: No respiratory distress





- Derm


Derm: Normal color, Warm and dry, No rash, Other (No left lower extremity 

erythema. No contusion.)





- Extremities


Extremities: No deformity, Normal ROM s pain, No edema, No calf tenderness / 

cord, Other (Patient has an approximately 2 cm diameter area of mild tenderness 

over his left anterior tibial area over the bone.  He has circled it with a pen.

 There is no swelling, mass, or other enlargement of the area.  The skin appears

completely normal on the outside.  No calf tenderness.  )





- Neuro


Neuro: Alert and oriented X 3, No motor deficit, No sensory deficit





- Psych


Psych: Normal mood, Normal affect





Results





- Vitals


Vitals: 


                                     Oxygen











O2 Source                      Room air

















- Rads (name of study)


  ** L tib-fib XR


Radiology: Final report received, EMP read indepedently, See rad report (neg)





PD MEDICAL DECISION MAKING





- ED course


Complexity details: reviewed results, re-evaluated patient, considered 

differential, d/w patient


ED course: 


The patient's physical exam was unremarkable other than a very focused area of 

tenderness which mainly involve the bone itself.  Overall I felt this was very 

low risk but the patient was insistent that he does not feel any pain unless 

something is "very wrong".  I explained to the patient why I did not feel he had

a DVT, I also explained that there is no evidence of infection.  I sent him for 

an x-ray to evaluate for a pathologic lesion of the bone, and this was negative.

 The patient has no evidence of soft tissue trauma overlying the tibial area, at

this point, I have advised the patient that I find absolutely no evidence of a 

serious or emergent condition.  I have discussed with him that if he has further

concerns about this focused area of mild pain and tenderness then he will need 

to follow-up with his primary care physician.  We have discussed the usual 

indications for return.








Departure





- Departure


Disposition: 01 Home, Self Care


Clinical Impression: 


Pain in extremity


Qualifiers:


 Extremity pain location: lower leg Laterality: left Qualified Code(s): M79.662 

- Pain in left lower leg





Condition: Stable


Comments: 


Other than mild tenderness, your leg looks completely normal.  No evidence of a 

blood clot as you have no tenderness or swelling in the calf muscle, and your 

pain and tenderness are entirely over the bone.  Your bone looks good on x-ray. 

There is no evidence of a break or cancerous process.  There is no evidence of 

infection in the soft tissues of your leg.  There is also no evidence of trauma.

 Is not clear why you have the mild tenderness over the focused area of your 

bone, but this should get better on its own in time.


Discharge Date/Time: 01/07/21 11:56

## 2021-01-13 NOTE — EXTERNAL MEDICAL SUMMARY RPT
Continuity of Care Document

                           Created on:2021



Patient:VELVET SYKES

Sex:Male

:1955

External Reference #:[id number]





Demographics







                          Phone                     Unavailable

 

                          Preferred Language        English

 

                          Marital Status            Unknown

 

                          Hindu Affiliation     Unknown

 

                          Race                      Unknown

 

                          Ethnic Group              Unknown









Author







                          Organization              [facility]

 

                          [place] [address]

 

                          Phone                     [phone]









Support







                Name            Relationship    Address         Phone

 

                [place]        Unavailable     Unavailable     Unavailable









Care Team Providers







                    Name                Role                Phone

 

                    Holiday             Unavailable         Unavailable

 

                    ARNP                Unavailable         Unavailable









Problems







                     date                description         facility

 

                     2020 05:48    Localization-related (focal)  [facility]



                                        (partial) symptomatic epilepsy 



                                        and epileptic syndromes with 



                                        complex partial seizures, 



                                        intractable, without status 



                                        epilepticus         

 

                     2020 00:00:00  Localization-related (focal)  [facility]



                                        (partial) epilepsy and [facility]



                                        epileptic syndromes with simple 



                                        partial seizures, without 



                                        mention of intractable epilepsy 

 

                     2020 00:00:00  Localization-related (focal)  [facility]



                                        (partial) symptomatic epilepsy [facility]



                                        and epileptic syndromes with 



                                        simple partial seizures, not 



                                        intractable, without status 



                                        epilepticus         

 

                     2020 00:00:00  Localization[facility]related(focal)(pa  [facility]

[facility]



                                        rtial)idiopathic epilepsy and [facility]



                                        epileptic syndromes with 



                                        seizures of localized onset 







Allergies







                     date                description         facility

 

                                         CHLORHEXIDINE       [facility]

[facility]

 

                                         IODINATED CONTRAST MEDIA  [facility]

 

                                         LATEX               [facility]

 

                                         METRIZAMIDE         [facility]

 

                                         DILTIAZEM           [facility]

 

                                         IODINE              [facility]

[facility]

 

                                         BEE VENOM           [facility]

[facility]

 

                                         CIPROFLOXACIN       [facility]

 

                                         CODEINE             [facility]

[facility]

 

                                         HYDROCODONE         [facility]

 

                                         INFLIXIMAB          [facility]

 

                                         MORPHINE            [facility]

 

                                         PREDNISONE          [facility]

 

                                         SIMVASTATIN         [facility]

 

                                         HYDROCODONE-ACETAMINOPHEN  [facility]

 

                                         NSAIDS              [facility]

[facility]

 

                                         PENICILLINS         [facility]

[facility]

 

                                         TETANUS TOXOIDS     [facility]

 

                                         shellfish           [facility]

 

                                         IODINE              [facility]

[facility]

 

                                         SHELLFISH-DERIVED PRODUCTS  [facility]

 

                                         ASPIRIN             [facility]

 

                                         METOCLOPRAMIDE HCL  [facility]

[facility]

 

                                         HYDROCHLOROTHIAZIDE  [facility]

 

                                         TRIAMTERENE         [facility]

 

                                         IBUPROFEN           [facility]

[facility]

 

                                         SULFAMETHOXAZOLE    [facility]

[facility]

 

                                         TRAMADOL            [facility]

[facility]

 

                                         PROMETHAZINE        [facility]

[facility]

 

                                         cyclobenzaprine HCl *  [facility]

[facility]

 

                                         pseudoephedrine HCl *  [facility]

 

                                         cefazolin sodium *  [facility]

[facility]

 

                                         Penicillins         [facility]

[facility]

 

                                         Sulfa (Sulfonamide Antibiotics)  [facility]

[facility]

 

                                         lisinopril          [facility]

 

                                         iodine              [facility]

 

                                         acetaminophen       [facility]

 

                                         dextroamphetamine   [facility]

 

                                         hydrochlorothiazide  [facility]

 

                                         terfenadine         [facility]

 

                                         cimetidine          [facility]

 

                                         famotidine          [facility]

 

                                         omeprazole          [facility]

 

                                         rabeprazole         [facility]

 

                                         red dye             [facility]

 

                                         PENICILLIN V POTASSIUM  [facility]

[facility]







Medications







                     date                description         facility

 

                     2020 00:00:00  null                [facility]

 

                     2020 00:00:00  null                [facility]

 

                     2020 00:00:00  null                [facility]

 

                     2020 00:00:00  null                [facility]

[facility]

 

                     2020 00:00:00  null                [facility]

[facility]

 

                     2020 00:00:00  null                [facility]

[facility]

 

                     2020 00:00:00  null                [facility]

[facility]

 

                     2020 00:00:00  null                [facility]

[facility]

 

                     2020 00:00:00  null                [facility]

[facility]

 

                     2020 00:00:00  null                [facility]

[facility]

 

                     2020 00:00:00  DEXAMETHASONE       [facility]

[facility]

 

                     2020 00:00:00  ERYTHROMYCIN        [facility]

[facility]

 

                     2020 00:00:00  ROSUVASTATIN CALCIUM  [facility]

[facility]

 

                     2020 00:00:00  NITROGLYCERIN       [facility]

[facility]

 

                     2020 00:00:00  ZONISAMIDE          [facility]

[facility]

 

                     2020 00:00:00  DEXAMETHASONE       [facility]

[facility]

 

                     2020 00:00:00  NITROGLYCERIN       [facility]

[facility]

 

                     2020 00:00:00  ERYTHROMYCIN        [facility]

[facility]

 

                     2020 00:00:00  ZONISAMIDE          [facility]

[facility]

 

                     2020 00:00:00  ROSUVASTATIN CALCIUM  [facility]

[facility]

 

                     2021 00:00:00  null                [facility]

[facility]

 

                     2021 00:00:00  null                [facility]

[facility]

 

                     2021 00:00:00  ISOSORBIDE MONONITRATE  [facility]

 

                     2021 00:00:00  ISOSORBIDE MONONITRATE  [facility]







Social History







                     date                description         facility

 

                     [id number]

## 2021-04-19 ENCOUNTER — HOSPITAL ENCOUNTER (OUTPATIENT)
Dept: HOSPITAL 76 - LAB.WCP | Age: 66
Discharge: HOME | End: 2021-04-19
Attending: FAMILY MEDICINE
Payer: MEDICARE

## 2021-04-19 DIAGNOSIS — R10.32: Primary | ICD-10-CM

## 2021-04-19 LAB
ALBUMIN DIAFP-MCNC: 4.5 G/DL (ref 3.2–5.5)
ALBUMIN/GLOB SERPL: 1.7 {RATIO} (ref 1–2.2)
ALP SERPL-CCNC: 57 IU/L (ref 42–121)
ALT SERPL W P-5'-P-CCNC: 23 IU/L (ref 10–60)
ANION GAP SERPL CALCULATED.4IONS-SCNC: 8 MMOL/L (ref 6–13)
AST SERPL W P-5'-P-CCNC: 24 IU/L (ref 10–42)
BASOPHILS NFR BLD AUTO: 0.1 10^3/UL (ref 0–0.1)
BASOPHILS NFR BLD AUTO: 0.9 %
BILIRUB BLD-MCNC: 1 MG/DL (ref 0.2–1)
BUN SERPL-MCNC: 33 MG/DL (ref 6–20)
CALCIUM UR-MCNC: 9.6 MG/DL (ref 8.5–10.3)
CHLORIDE SERPL-SCNC: 105 MMOL/L (ref 101–111)
CO2 SERPL-SCNC: 24 MMOL/L (ref 21–32)
CREAT SERPLBLD-SCNC: 1.2 MG/DL (ref 0.6–1.2)
EOSINOPHIL # BLD AUTO: 0.4 10^3/UL (ref 0–0.7)
EOSINOPHIL NFR BLD AUTO: 5 %
ERYTHROCYTE [DISTWIDTH] IN BLOOD BY AUTOMATED COUNT: 12 % (ref 12–15)
GFRSERPLBLD MDRD-ARVRAT: 61 ML/MIN/{1.73_M2} (ref 89–?)
GLOBULIN SER-MCNC: 2.6 G/DL (ref 2.1–4.2)
GLUCOSE SERPL-MCNC: 97 MG/DL (ref 70–100)
HCT VFR BLD AUTO: 42.6 % (ref 42–52)
HGB UR QL STRIP: 14.4 G/DL (ref 14–18)
LIPASE SERPL-CCNC: 42 U/L (ref 22–51)
LYMPHOCYTES # SPEC AUTO: 1.5 10^3/UL (ref 1.5–3.5)
LYMPHOCYTES NFR BLD AUTO: 18.2 %
MCH RBC QN AUTO: 32.4 PG (ref 27–31)
MCHC RBC AUTO-ENTMCNC: 33.8 G/DL (ref 32–36)
MCV RBC AUTO: 95.9 FL (ref 80–94)
MONOCYTES # BLD AUTO: 0.9 10^3/UL (ref 0–1)
MONOCYTES NFR BLD AUTO: 11.4 %
NEUTROPHILS # BLD AUTO: 5.2 10^3/UL (ref 1.5–6.6)
NEUTROPHILS # SNV AUTO: 8.1 X10^3/UL (ref 4.8–10.8)
NEUTROPHILS NFR BLD AUTO: 64 %
NRBC # BLD AUTO: 0 /100WBC
NRBC # BLD AUTO: 0 X10^3/UL
PDW BLD AUTO: 11.1 FL (ref 7.4–11.4)
PLATELET # BLD: 206 10^3/UL (ref 130–450)
POTASSIUM SERPL-SCNC: 3.8 MMOL/L (ref 3.5–5)
PROT SPEC-MCNC: 7.1 G/DL (ref 6.7–8.2)
RBC MAR: 4.44 10^6/UL (ref 4.7–6.1)
SODIUM SERPLBLD-SCNC: 137 MMOL/L (ref 135–145)

## 2021-04-19 PROCEDURE — 80053 COMPREHEN METABOLIC PANEL: CPT

## 2021-04-19 PROCEDURE — 85025 COMPLETE CBC W/AUTO DIFF WBC: CPT

## 2021-04-19 PROCEDURE — 83690 ASSAY OF LIPASE: CPT

## 2021-04-19 PROCEDURE — 36415 COLL VENOUS BLD VENIPUNCTURE: CPT

## 2021-04-28 ENCOUNTER — HOSPITAL ENCOUNTER (OUTPATIENT)
Dept: HOSPITAL 76 - DI | Age: 66
Discharge: HOME | End: 2021-04-28
Attending: FAMILY MEDICINE
Payer: MEDICARE

## 2021-04-28 DIAGNOSIS — M47.816: ICD-10-CM

## 2021-04-28 DIAGNOSIS — M48.061: ICD-10-CM

## 2021-04-28 DIAGNOSIS — I25.10: Primary | ICD-10-CM

## 2021-04-28 PROCEDURE — 74177 CT ABD & PELVIS W/CONTRAST: CPT

## 2021-04-28 NOTE — CT REPORT
PROCEDURE:  Abdomen/Pelvis W

 

INDICATIONS:  LLQ ABD PAIN

 

CONTRAST:  IV CONTRAST: Isovue 300 ml: 100 PO CONTRAST: Isovue 300 ml50

 

TECHNIQUE:  

After the administration of intravenous and oral contrast, 5 mm thick sections acquired from the diap
hragms to the symphysis.  5 mm thick coronal and sagittal reformats were acquired.  For radiation dos
e reduction, the following was used:  automated exposure control, adjustment of mA and/or kV accordin
g to patient size.  

 

COMPARISON:  None.

 

FINDINGS:  

Image quality:  Excellent.  

 

ABDOMEN:  

Lung bases:  Lung bases are clear.  Heart size is normal. Coronary artery calcifications. 

 

Solid organs:  Liver and spleen are normal in size and enhancement.  Gallbladder is unremarkable.  Bi
liary system is non dilated.  Pancreas enhances normally. 8 mm right adrenal nodule likely representi
ng adrenal adenoma. Kidneys demonstrate normal size and enhancement, without hydronephrosis.  

 

Peritoneum and bowel:  Bowel loops demonstrate normal wall thickness and caliber.  No free fluid or a
ir.  

 

Nodes and vessels:  No retroperitoneal or mesenteric adenopathy by size criteria.  Aorta and inferior
 vena cava are normal in size.  Incidental note is made of the presence of a retroaortic left renal v
ein.

 

Miscellaneous:  No ventral hernias.  

 

 

PELVIS:  

Genitourinary:  Bladder wall thickness is normal.  

 

Miscellaneous:  No inguinal hernias or adenopathy.  

 

Bones:  No suspicious bony lesions.  No vertebral body compression fractures.  Lumbar degenerative ch
to with at least moderate canal stenosis at L4-L5.

 

IMPRESSION:  

1. No evidence of acute abdominal process.

2. Incidental probable subcentimeter right adrenal adenoma.

3. Coronary artery disease.

4. Lumbar degenerative change with at least moderate canal stenosis at L4-L5.

 

Reviewed by: Sanchez Williamson MD on 4/28/2021 2:41 PM PDT

Approved by: Sanchez Williamson MD on 4/28/2021 2:41 PM PDT

 

 

Station ID:  [id number]

## 2021-06-01 ENCOUNTER — HOSPITAL ENCOUNTER (OUTPATIENT)
Dept: HOSPITAL 76 - LAB.N | Age: 66
Discharge: HOME | End: 2021-06-01
Attending: INTERNAL MEDICINE
Payer: MEDICARE

## 2021-06-01 DIAGNOSIS — I10: Primary | ICD-10-CM

## 2021-06-01 DIAGNOSIS — E78.5: ICD-10-CM

## 2021-06-01 LAB
ANION GAP SERPL CALCULATED.4IONS-SCNC: 11 MMOL/L (ref 6–13)
BASOPHILS NFR BLD AUTO: 0.1 10^3/UL (ref 0–0.1)
BASOPHILS NFR BLD AUTO: 1.1 %
BUN SERPL-MCNC: 25 MG/DL (ref 6–20)
CALCIUM UR-MCNC: 9.1 MG/DL (ref 8.5–10.3)
CHLORIDE SERPL-SCNC: 103 MMOL/L (ref 101–111)
CHOLEST SERPL-MCNC: 113 MG/DL
CO2 SERPL-SCNC: 22 MMOL/L (ref 21–32)
CREAT SERPLBLD-SCNC: 1.1 MG/DL (ref 0.6–1.2)
EOSINOPHIL # BLD AUTO: 0.4 10^3/UL (ref 0–0.7)
EOSINOPHIL NFR BLD AUTO: 6.1 %
ERYTHROCYTE [DISTWIDTH] IN BLOOD BY AUTOMATED COUNT: 11.9 % (ref 12–15)
GFRSERPLBLD MDRD-ARVRAT: 67 ML/MIN/{1.73_M2} (ref 89–?)
GLUCOSE SERPL-MCNC: 96 MG/DL (ref 70–100)
HCT VFR BLD AUTO: 39.3 % (ref 42–52)
HDLC SERPL-MCNC: 50 MG/DL
HDLC SERPL: 2.3 {RATIO} (ref ?–5)
HGB UR QL STRIP: 13.7 G/DL (ref 14–18)
LYMPHOCYTES # SPEC AUTO: 1.1 10^3/UL (ref 1.5–3.5)
LYMPHOCYTES NFR BLD AUTO: 17.8 %
MCH RBC QN AUTO: 33 PG (ref 27–31)
MCHC RBC AUTO-ENTMCNC: 34.9 G/DL (ref 32–36)
MCV RBC AUTO: 94.7 FL (ref 80–94)
MONOCYTES # BLD AUTO: 0.7 10^3/UL (ref 0–1)
MONOCYTES NFR BLD AUTO: 11.6 %
NEUTROPHILS # BLD AUTO: 3.9 10^3/UL (ref 1.5–6.6)
NEUTROPHILS # SNV AUTO: 6.1 X10^3/UL (ref 4.8–10.8)
NEUTROPHILS NFR BLD AUTO: 63.1 %
NRBC # BLD AUTO: 0 /100WBC
NRBC # BLD AUTO: 0 X10^3/UL
PDW BLD AUTO: 11.6 FL (ref 7.4–11.4)
PLATELET # BLD: 180 10^3/UL (ref 130–450)
POTASSIUM SERPL-SCNC: 4 MMOL/L (ref 3.5–5)
RBC MAR: 4.15 10^6/UL (ref 4.7–6.1)
SODIUM SERPLBLD-SCNC: 136 MMOL/L (ref 135–145)
TRIGL P FAST SERPL-MCNC: 31 MG/DL

## 2021-06-01 PROCEDURE — 85025 COMPLETE CBC W/AUTO DIFF WBC: CPT

## 2021-06-01 PROCEDURE — 80048 BASIC METABOLIC PNL TOTAL CA: CPT

## 2021-06-01 PROCEDURE — 80061 LIPID PANEL: CPT

## 2021-06-01 PROCEDURE — 83721 ASSAY OF BLOOD LIPOPROTEIN: CPT

## 2021-06-01 PROCEDURE — 36415 COLL VENOUS BLD VENIPUNCTURE: CPT

## 2021-10-05 ENCOUNTER — HOSPITAL ENCOUNTER (OUTPATIENT)
Dept: HOSPITAL 76 - LAB | Age: 66
Discharge: HOME | End: 2021-10-05
Attending: FAMILY MEDICINE
Payer: MEDICARE

## 2021-10-05 DIAGNOSIS — R05.3: Primary | ICD-10-CM

## 2021-10-05 DIAGNOSIS — Z20.822: ICD-10-CM

## 2021-10-14 ENCOUNTER — HOSPITAL ENCOUNTER (OUTPATIENT)
Dept: HOSPITAL 76 - DI.N | Age: 66
Discharge: HOME | End: 2021-10-14
Attending: FAMILY MEDICINE
Payer: MEDICARE

## 2021-10-14 DIAGNOSIS — R05.3: Primary | ICD-10-CM

## 2021-10-14 NOTE — XRAY REPORT
PROCEDURE:  Chest 2 View X-Ray

 

INDICATIONS:  CHRONIC COUGH

 

TECHNIQUE:  2 view(s) of the chest.  

 

COMPARISON:  April 4, 2018

 

 

FINDINGS: 

SUPPORT DEVICES: None.

LUNG/PLEURA: No focal consolidation or pulmonary edema. No pleural effusion or space-occupying pneumo
thorax.

MEDIASTINUM: The cardiomediastinal silhouette is within normal limits.

BONES/SOFT TISSUES: No acute abnormality. Healed fracture deformity of the right second rib.

 

IMPRESSION: 

1.No acute cardiopulmonary abnormality.

 

 

 

Reviewed by: Apolinar Sanabria MD on 10/14/2021 3:00 PM PDT

Approved by: Apolinar Sanabria MD on 10/14/2021 3:00 PM PDT

 

 

Station ID:  [id number]

## 2022-12-06 ENCOUNTER — HOSPITAL ENCOUNTER (OUTPATIENT)
Dept: HOSPITAL 76 - SDS | Age: 67
Discharge: HOME | End: 2022-12-06
Attending: SURGERY
Payer: MEDICARE

## 2022-12-06 ENCOUNTER — HOSPITAL ENCOUNTER (EMERGENCY)
Dept: HOSPITAL 76 - ED | Age: 67
Discharge: HOME | End: 2022-12-06
Payer: MEDICARE

## 2022-12-06 VITALS — SYSTOLIC BLOOD PRESSURE: 139 MMHG | DIASTOLIC BLOOD PRESSURE: 82 MMHG

## 2022-12-06 VITALS — DIASTOLIC BLOOD PRESSURE: 82 MMHG | SYSTOLIC BLOOD PRESSURE: 134 MMHG

## 2022-12-06 DIAGNOSIS — Z95.1: ICD-10-CM

## 2022-12-06 DIAGNOSIS — R07.9: Primary | ICD-10-CM

## 2022-12-06 DIAGNOSIS — R07.9: ICD-10-CM

## 2022-12-06 DIAGNOSIS — Z95.5: ICD-10-CM

## 2022-12-06 DIAGNOSIS — Z12.11: Primary | ICD-10-CM

## 2022-12-06 DIAGNOSIS — I25.10: ICD-10-CM

## 2022-12-06 DIAGNOSIS — I10: ICD-10-CM

## 2022-12-06 LAB
ALBUMIN DIAFP-MCNC: 4.3 G/DL (ref 3.2–5.5)
ALBUMIN/GLOB SERPL: 1.7 {RATIO} (ref 1–2.2)
ALP SERPL-CCNC: 54 IU/L (ref 42–121)
ALT SERPL W P-5'-P-CCNC: 24 IU/L (ref 10–60)
ANION GAP SERPL CALCULATED.4IONS-SCNC: 12 MMOL/L (ref 6–13)
AST SERPL W P-5'-P-CCNC: 28 IU/L (ref 10–42)
BASOPHILS NFR BLD AUTO: 0 10^3/UL (ref 0–0.1)
BASOPHILS NFR BLD AUTO: 0.6 %
BILIRUB BLD-MCNC: 0.8 MG/DL (ref 0.2–1)
BUN SERPL-MCNC: 19 MG/DL (ref 6–20)
CALCIUM UR-MCNC: 9.6 MG/DL (ref 8.5–10.3)
CHLORIDE SERPL-SCNC: 104 MMOL/L (ref 101–111)
CO2 SERPL-SCNC: 27 MMOL/L (ref 21–32)
CREAT SERPLBLD-SCNC: 1 MG/DL (ref 0.6–1.2)
EOSINOPHIL # BLD AUTO: 0.2 10^3/UL (ref 0–0.7)
EOSINOPHIL NFR BLD AUTO: 3.4 %
ERYTHROCYTE [DISTWIDTH] IN BLOOD BY AUTOMATED COUNT: 11.8 % (ref 12–15)
GFRSERPLBLD MDRD-ARVRAT: 75 ML/MIN/{1.73_M2} (ref 89–?)
GLOBULIN SER-MCNC: 2.5 G/DL (ref 2.1–4.2)
GLUCOSE SERPL-MCNC: 96 MG/DL (ref 70–100)
HCT VFR BLD AUTO: 45.7 % (ref 42–52)
HGB UR QL STRIP: 15.6 G/DL (ref 14–18)
LIPASE SERPL-CCNC: 45 U/L (ref 22–51)
LYMPHOCYTES # SPEC AUTO: 1 10^3/UL (ref 1.5–3.5)
LYMPHOCYTES NFR BLD AUTO: 20.5 %
MCH RBC QN AUTO: 32.2 PG (ref 27–31)
MCHC RBC AUTO-ENTMCNC: 34.1 G/DL (ref 32–36)
MCV RBC AUTO: 94.2 FL (ref 80–94)
MONOCYTES # BLD AUTO: 0.6 10^3/UL (ref 0–1)
MONOCYTES NFR BLD AUTO: 12.4 %
NEUTROPHILS # BLD AUTO: 3.2 10^3/UL (ref 1.5–6.6)
NEUTROPHILS # SNV AUTO: 5.1 X10^3/UL (ref 4.8–10.8)
NEUTROPHILS NFR BLD AUTO: 62.7 %
NRBC # BLD AUTO: 0 /100WBC
NRBC # BLD AUTO: 0 X10^3/UL
PDW BLD AUTO: 10 FL (ref 7.4–11.4)
PLATELET # BLD: 194 10^3/UL (ref 130–450)
POTASSIUM SERPL-SCNC: 4.5 MMOL/L (ref 3.5–5)
PROT SPEC-MCNC: 6.8 G/DL (ref 6.7–8.2)
RBC MAR: 4.85 10^6/UL (ref 4.7–6.1)
SODIUM SERPLBLD-SCNC: 143 MMOL/L (ref 135–145)

## 2022-12-06 PROCEDURE — 85025 COMPLETE CBC W/AUTO DIFF WBC: CPT

## 2022-12-06 PROCEDURE — 84484 ASSAY OF TROPONIN QUANT: CPT

## 2022-12-06 PROCEDURE — 71045 X-RAY EXAM CHEST 1 VIEW: CPT

## 2022-12-06 PROCEDURE — 99284 EMERGENCY DEPT VISIT MOD MDM: CPT

## 2022-12-06 PROCEDURE — 83690 ASSAY OF LIPASE: CPT

## 2022-12-06 PROCEDURE — 36415 COLL VENOUS BLD VENIPUNCTURE: CPT

## 2022-12-06 PROCEDURE — 83735 ASSAY OF MAGNESIUM: CPT

## 2022-12-06 PROCEDURE — 80053 COMPREHEN METABOLIC PANEL: CPT

## 2022-12-06 PROCEDURE — 93005 ELECTROCARDIOGRAM TRACING: CPT

## 2022-12-06 NOTE — CONSULTATION NOTE
Consultation Report: 


I evaluated this patient and he said he had chest pain (his angina equivalent 

chest discomort) early this am and started having it now as I am speaking with 

him (approx 1200) 12 lead EKG done and read by hospitalist.  Dr. Giles said I 

should send patient to ER for evaluation and rule out.  Charge nurse notified by

RN. Chest discomfort now resolved.  Dr Bautista made aware.

## 2022-12-06 NOTE — ED PHYSICIAN DOCUMENTATION
PD HPI CHEST PAIN





- Stated complaint


Stated Complaint: CHEST PX





- History obtained from


History obtained from: Patient





- History of Present Illness


Timing - onset: Today


Timing - onset during: Light activity (He states he was this morning at home 

finishing his prep for colonoscopy with drinking more laxative and on the 

toilet.  He had a brief episode of chest pressure/pain lasting a few seconds.  

This occurred again while in the preop area for again just a few seconds.  EKG 

was done showing poor R wave.)


Timing - duration: Seconds


Timing - details: Abrupt onset, Now resolved


Quality: Pressure, Aching


Location: Substernal, Left chest


Radiation: No: Jaw, Neck, Back


Worsened by: No: Inspiration, Movement


Associated symptoms: No: Shortness of air, Diaphoresis, Nausea, Feeling faint / 

dizzy


Similar symptoms before: Diagnosis (He states MI/heart pain episode in 2017 

leading to stents that had similar character that was prolonged at the time.)





Review of Systems


Constitutional: denies: Fever, Chills


Nose: denies: Rhinorrhea / runny nose, Congestion


Throat: denies: Sore throat


Cardiac: denies: Chest pain / pressure (He is not particularly active and lives 

on a single story home.  He does do some walking at work.  No regular aerobic 

exercise.  Denies any exertional chest pain, dyspnea, lightheaded in the recent 

past.), Palpitations, Pedal edema


Respiratory: denies: Cough


GI: denies: Vomiting, Diarrhea (not prior to the colon prep for scope the past 

few days.)


Musculoskeletal: denies: Extremity swelling


Neurologic: denies: Generalized weakness, Focal weakness, Numbness, Near syncope





PD PAST MEDICAL HISTORY





- Past Medical History


Cardiovascular: Hypertension, High cholesterol, Coronary artery disease, Murmur


Respiratory: None


Neuro: Seizure disorder


Endocrine/Autoimmune: None


GI: GERD


: None


HEENT: Glaucoma, Chronic hearing loss, Other


Psych: None


Musculoskeletal: None


Derm: None





- Past Surgical History


Past Surgical History: Yes


Cardiovascular: Coronary stent, Cardiac catheterization


Neuro: Other


HEENT: Tonsil/Adenoidectomy





- Present Medications


Home Medications: 


                                Ambulatory Orders











 Medication  Instructions  Recorded  Confirmed


 


Levetiracetam [Keppra] 1,750 mg PO BID 04/04/18 12/06/22


 


Nitroglycerin 0.4 mg SL PRN PRN #1 bottle 11/28/18 12/01/22


 


Aspirin Chewable [[place] 81 mg DAILY 12/28/18 12/06/22





Aspirin]   


 


Calcium Carbonate [Calcium] 1 tab DAILY 12/28/18 12/06/22


 


Ezetimibe [Zetia] 10 mg DAILY 01/07/21 12/06/22


 


Isosorbide Mononitrate [Isosorbide 120 mg DAILY 01/07/21 12/06/22





Mononitrate ER]   


 


Ranolazine [Ranolazine ER] 500 mg BID 01/07/21 12/06/22


 


Rosuvastatin Calcium [Crestor] 40 mg QPM 01/07/21 12/06/22


 


Zonisamide [Zonegran] 50 mg PO QPM 01/07/21 12/06/22


 


Zonisamide [Zonegran] 100 mg PO QPM 01/07/21 12/06/22


 


Multivitamin 1 each PO DAILY 12/01/22 12/06/22














- Allergies


Allergies/Adverse Reactions: 


                                    Allergies











Allergy/AdvReac Type Severity Reaction Status Date / Time


 


dextroamphetamine Allergy  Edema Verified 12/06/22 12:52





[From Dexedrine]     


 


Penicillins Allergy  Unknown Verified 12/06/22 12:52


 


phenobarbital Allergy  Unknown Verified 12/06/22 12:52














- Social History


Does the pt smoke?: No


Smoking Status: Never smoker


Does the pt drink ETOH?: No


Does the pt have substance abuse?: No





- Immunizations


Immunizations are current?: No


Immunizations: TDAP >10years/unknown





- POLST


Patient has POLST: No





PD ED PE NORMAL





- Vitals


Vital signs reviewed: Yes





- General


General: Alert and oriented X 3, No acute distress, Well developed/nourished





- Neck


Neck: Supple, no meningeal sign, No adenopathy, No JVD





- Cardiac


Cardiac: RRR, No murmur, Other (no chestwall tenderness. )





- Respiratory


Respiratory: No respiratory distress, Clear bilaterally





- Abdomen


Abdomen: Soft, Non tender





- Derm


Derm: Normal color, Warm and dry





- Extremities


Extremities: No edema, No calf tenderness / cord





- Neuro


Neuro: Alert and oriented X 3, No motor deficit, Normal speech





Results





- Vitals


Vitals: 


                               Vital Signs - 24 hr











  12/06/22





  12:49


 


Temperature 36.7 C


 


Heart Rate 60


 


Respiratory 12





Rate 


 


Blood Pressure 143/89 H


 


O2 Saturation 100








                                     Oxygen











O2 Source                      Room air

















- EKG (time done)


  ** 12:49


Rate: Rate (enter#) (60)


Rhythm: NSR


Axis: Normal


Intervals: Normal MD


QRS: Poor R wave progression, Low voltage


Ischemia: Normal ST segments.  No: ST elevation c/w ischemia, ST depression





- Labs


Labs: 


                                Laboratory Tests











  12/06/22 12/06/22 12/06/22





  12:57 12:57 12:57


 


WBC  5.1  


 


RBC  4.85  


 


Hgb  15.6  


 


Hct  45.7  


 


MCV  94.2 H  


 


MCH  32.2 H  


 


MCHC  34.1  


 


RDW  11.8 L  


 


Plt Count  194  


 


MPV  10.0  


 


Neut # (Auto)  3.2  


 


Lymph # (Auto)  1.0 L  


 


Mono # (Auto)  0.6  


 


Eos # (Auto)  0.2  


 


Baso # (Auto)  0.0  


 


Absolute Nucleated RBC  0.00  


 


Nucleated RBC %  0.0  


 


Sodium   143 


 


Potassium   4.5 


 


Chloride   104 


 


Carbon Dioxide   27 


 


Anion Gap   12.0 


 


BUN   19 


 


Creatinine   1.0 


 


Estimated GFR (MDRD)   75 L 


 


Glucose   96 


 


Calcium   9.6 


 


Magnesium   


 


Total Bilirubin   0.8 


 


AST   28 


 


ALT   24 


 


Alkaline Phosphatase   54 


 


Troponin I High Sens    2.4


 


Total Protein   6.8 


 


Albumin   4.3 


 


Globulin   2.5 


 


Albumin/Globulin Ratio   1.7 


 


Lipase   45 














  12/06/22





  12:57


 


WBC 


 


RBC 


 


Hgb 


 


Hct 


 


MCV 


 


MCH 


 


MCHC 


 


RDW 


 


Plt Count 


 


MPV 


 


Neut # (Auto) 


 


Lymph # (Auto) 


 


Mono # (Auto) 


 


Eos # (Auto) 


 


Baso # (Auto) 


 


Absolute Nucleated RBC 


 


Nucleated RBC % 


 


Sodium 


 


Potassium 


 


Chloride 


 


Carbon Dioxide 


 


Anion Gap 


 


BUN 


 


Creatinine 


 


Estimated GFR (MDRD) 


 


Glucose 


 


Calcium 


 


Magnesium  2.2


 


Total Bilirubin 


 


AST 


 


ALT 


 


Alkaline Phosphatase 


 


Troponin I High Sens 


 


Total Protein 


 


Albumin 


 


Globulin 


 


Albumin/Globulin Ratio 


 


Lipase 














- Rads (name of study)


  ** chest xray


Radiology: Prelim report reviewed (no acute process), See rad report





PD MEDICAL DECISION MAKING





- ED course


Complexity details: reviewed results, considered differential (Brief chest 

discomfort lasting only seconds that were nonexertional and not associated with 

nausea, dyspnea, diaphoresis.  EKG is nonischemic but has a delayed R wave 

progression.  Troponin and chest x-ray are normal.  No MI/heart failure.), d/w 

patient, d/w consultant (Rey, Anesth, and they will take him back to Pre-op for

colonoscopy.)





Departure





- Departure


Disposition: 01 Home, Self Care


Clinical Impression: 


 Chest pain in adult





Clinical Impression: 


 (Ruled Out): Myocardial infarction


Condition: Stable


Record reviewed to determine appropriate education?: Yes


Comments: 


Your blood test is normal.  No signs of heart injury/heart attack.  Unclear the 

cause of the chest pain episode he had.  I believe the surgical suite will still

go on with your planned colonoscopy this afternoon.  Remain nothing to eat or 

drink at this time.

## 2022-12-06 NOTE — ANESTHESIA
Pre-Anesthesia VS, & Labs





- Diagnosis





screening colonoscopy





- Procedure





colonoscopy


Vital Signs: 





                                        











Temp Pulse Resp BP Pulse Ox O2 Flow Rate


 


 36.1 C L  63   15   154/95 H  98    


 


 12/06/22 10:28  12/06/22 10:28  12/06/22 10:28  12/06/22 10:28  12/06/22 10:28 

 











Height: 5 ft 10 in


Weight (kg): 73.2 kg


Body Mass Index: 23.1


BMI Classification: Normal





- NPO


>8 hours





Home Medications and Allergies


Home Medications: 


Ambulatory Orders





Multivitamin 1 each PO DAILY 12/01/22 











                                        





Levetiracetam [Keppra] 1,750 mg PO BID 04/04/18 


Aspirin Chewable [[place] Aspirin] 81 mg DAILY 12/28/18 


Calcium Carbonate [Calcium] 1 tab DAILY 12/28/18 


Ezetimibe [Zetia] 10 mg DAILY 01/07/21 


Isosorbide Mononitrate [Isosorbide Mononitrate ER] 120 mg DAILY 01/07/21 


Ranolazine [Ranolazine ER] 500 mg BID 01/07/21 


Rosuvastatin Calcium [Crestor] 40 mg QPM 01/07/21 


Zonisamide [Zonegran] 50 mg PO QPM 01/07/21 


Zonisamide [Zonegran] 100 mg PO QPM 01/07/21 


Multivitamin 1 each PO DAILY 12/01/22 








Allergies/Adverse Reactions: 


                                    Allergies











Allergy/AdvReac Type Severity Reaction Status Date / Time


 


dextroamphetamine Allergy  Edema Verified 01/22/19 11:13





[From Dexedrine]     


 


Penicillins Allergy  Unknown Verified 01/22/19 11:13


 


phenobarbital Allergy  Unknown Verified 01/07/21 10:25














Anes History & Medical History





- Anesthetic History


Anesthesia Complications: reports: No previous complications





- Medical History


Cardiovascular: reports: Hypertension, High cholesterol, Coronary artery 

disease, Murmur


Pulmonary: reports: None


Gastrointestinal: reports: GERD


Urinary: reports: None


Neuro: reports: Seizure disorder


Musculoskeletal: reports: None


Endocrine/Autoimmune: reports: None


Blood Disorders: reports: None


Skin: reports: None


Smoking Status: Never smoker


History of Cancer?: No





- Surgical History


Eyes Ears Nose Throat (EENT): reports: Tonsil/Adenoidectomy


Cardiothoracic: reports: Coronary stent, Cardiac catheterization


Neurologic: reports: Other





Exam


General: Alert, Oriented x3, Cooperative


Dental: WNL


Neck Mobility: Normal


Mallampati classification: II


Thyromental Distance: greater than 6 cm


Respiratory: Lungs clear


Cardiovascular: Regular rate, Normal S1, Normal S2





Plan


Anesthesia Type: Total IV


Consent for Procedure(s) Verified and Reviewed: Yes


Code Status: Attempt Resuscitation


ASA classification: 3-Severe systemic disease


Is this case an emergency?: No

## 2022-12-06 NOTE — XRAY REPORT
PROCEDURE:  Chest 1 View X-Ray

 

INDICATIONS:  Chest Pain

 

TECHNIQUE:  One view of the chest was acquired.  

 

COMPARISON:  None.

 

FINDINGS:  

 

Surgical changes and devices:  None.  

 

Lungs and pleura:  No pleural effusions or pneumothorax.  Lungs are clear.  

 

Mediastinum:  Mediastinal contours appear normal.  Heart size is normal.  

 

Bones and chest wall:  No suspicious bony lesions.  Overlying soft tissues appear unremarkable.  

 

 

IMPRESSION: No acute cardiopulmonary abnormality.

 

Reviewed by: Fernando Nicole on 12/6/2022 1:12 PM PST

Approved by: Fernando Nicole on 12/6/2022 1:12 PM [facility]

 

 

Station ID:  [id number]

## 2022-12-08 NOTE — ANESTHESIA POST OP EVALUATION
Anesthesia Post Eval





- Post Anesthesia Eval


Vitals: 





                                Last Vital Signs











Temp  36.2 C L  12/06/22 17:18


 


Pulse  59 L  12/06/22 17:18


 


Resp  12   12/06/22 17:18


 


BP  139/82 H  12/06/22 17:18


 


Pulse Ox  98   12/06/22 17:18


 


O2 Flow Rate  2   12/06/22 12:15











CV Function Including HR & BP: Stable


Pain Control: Satisfactory


Nausea & Vomiting: Negative


Mental Status: Baseline


Respiratory Status: Airway Patent


Hydration Status: Satisfactory


Anesthesia Complications: None

## 2023-06-21 ENCOUNTER — HOSPITAL ENCOUNTER (OUTPATIENT)
Dept: HOSPITAL 76 - DI.N | Age: 68
Discharge: HOME | End: 2023-06-21
Attending: FAMILY MEDICINE
Payer: MEDICARE

## 2023-06-21 DIAGNOSIS — M75.41: Primary | ICD-10-CM

## 2023-06-21 NOTE — XRAY REPORT
PROCEDURE:  Shoulder 3 View RT

 

INDICATIONS:  IMPINGEMENT SYNDROME OF RIGHT SHOULDER

 

TECHNIQUE:  3 views of the shoulder were acquired.  

 

COMPARISON:  None.

 

FINDINGS:  

 

Bones:  No fractures or dislocations.  No suspicious bony lesions.  Visualized ribs appear intact.   
Moderate acromioclavicular narrowing.

 

Soft tissues:  No suspicious soft tissue calcifications.  

 

 

IMPRESSION:  

 

Moderate acromioclavicular narrowing.

 

 

 

Reviewed by: Lyn Puentes MD on 6/21/2023 4:53 PM PDT

Approved by: Lyn Puentes MD on 6/21/2023 4:53 PM PDT

 

 

Station ID:  [id number]

## 2023-12-05 ENCOUNTER — HOSPITAL ENCOUNTER (OUTPATIENT)
Dept: HOSPITAL 76 - LAB.N | Age: 68
Discharge: HOME | End: 2023-12-05
Attending: FAMILY MEDICINE
Payer: MEDICARE

## 2023-12-05 DIAGNOSIS — I10: Primary | ICD-10-CM

## 2023-12-05 DIAGNOSIS — H91.90: ICD-10-CM

## 2023-12-05 DIAGNOSIS — I25.10: ICD-10-CM

## 2023-12-05 DIAGNOSIS — Z12.5: ICD-10-CM

## 2023-12-05 DIAGNOSIS — L40.9: ICD-10-CM

## 2023-12-05 DIAGNOSIS — G40.209: ICD-10-CM

## 2023-12-05 LAB
ALBUMIN DIAFP-MCNC: 4.2 G/DL (ref 3.2–5.5)
ALBUMIN/GLOB SERPL: 2.1 {RATIO} (ref 1–2.2)
ALP SERPL-CCNC: 44 IU/L (ref 42–121)
ALT SERPL W P-5'-P-CCNC: 17 IU/L (ref 10–60)
ANION GAP SERPL CALCULATED.4IONS-SCNC: 3 MMOL/L (ref 6–13)
AST SERPL W P-5'-P-CCNC: 22 IU/L (ref 10–42)
BASOPHILS NFR BLD AUTO: 0.1 10^3/UL (ref 0–0.1)
BASOPHILS NFR BLD AUTO: 1.2 %
BILIRUB BLD-MCNC: 0.7 MG/DL (ref 0.2–1)
BUN SERPL-MCNC: 23 MG/DL (ref 6–20)
CALCIUM UR-MCNC: 9.7 MG/DL (ref 8.5–10.3)
CHLORIDE SERPL-SCNC: 106 MMOL/L (ref 101–111)
CHOLEST SERPL-MCNC: 106 MG/DL
CO2 SERPL-SCNC: 32 MMOL/L (ref 21–32)
CREAT SERPLBLD-SCNC: 1 MG/DL (ref 0.6–1.3)
EOSINOPHIL # BLD AUTO: 0.5 10^3/UL (ref 0–0.7)
EOSINOPHIL NFR BLD AUTO: 7.8 %
ERYTHROCYTE [DISTWIDTH] IN BLOOD BY AUTOMATED COUNT: 11.9 % (ref 12–15)
GFRSERPLBLD MDRD-ARVRAT: 75 ML/MIN/{1.73_M2} (ref 89–?)
GLOBULIN SER-MCNC: 2 G/DL (ref 2.1–4.2)
GLUCOSE SERPL-MCNC: 104 MG/DL (ref 74–104)
HCT VFR BLD AUTO: 47 % (ref 42–52)
HDLC SERPL-MCNC: 49 MG/DL
HDLC SERPL: 2.2 {RATIO} (ref ?–5)
HGB UR QL STRIP: 15.9 G/DL (ref 14–18)
LYMPHOCYTES # SPEC AUTO: 1.7 10^3/UL (ref 1.5–3.5)
LYMPHOCYTES NFR BLD AUTO: 29.2 %
MCH RBC QN AUTO: 32.6 PG (ref 27–31)
MCHC RBC AUTO-ENTMCNC: 33.8 G/DL (ref 32–36)
MCV RBC AUTO: 96.5 FL (ref 80–94)
MONOCYTES # BLD AUTO: 1 10^3/UL (ref 0–1)
MONOCYTES NFR BLD AUTO: 16 %
NEUTROPHILS # BLD AUTO: 2.7 10^3/UL (ref 1.5–6.6)
NEUTROPHILS # SNV AUTO: 5.9 X10^3/UL (ref 4.8–10.8)
NEUTROPHILS NFR BLD AUTO: 45.3 %
NRBC # BLD AUTO: 0 /100WBC
NRBC # BLD AUTO: 0 X10^3/UL
PDW BLD AUTO: 10.9 FL (ref 7.4–11.4)
PLATELET # BLD: 183 10^3/UL (ref 130–450)
POTASSIUM SERPL-SCNC: 4.6 MMOL/L (ref 3.5–4.5)
PROT SPEC-MCNC: 6.2 G/DL (ref 6.4–8.9)
RBC MAR: 4.87 10^6/UL (ref 4.7–6.1)
SODIUM SERPLBLD-SCNC: 141 MMOL/L (ref 135–145)
TRIGL P FAST SERPL-MCNC: 34 MG/DL (ref 48–352)
TSH SERPL-ACNC: 4.09 UIU/ML (ref 0.34–5.6)

## 2023-12-05 PROCEDURE — 36415 COLL VENOUS BLD VENIPUNCTURE: CPT

## 2023-12-05 PROCEDURE — 84443 ASSAY THYROID STIM HORMONE: CPT

## 2023-12-05 PROCEDURE — 84153 ASSAY OF PSA TOTAL: CPT

## 2023-12-05 PROCEDURE — 83721 ASSAY OF BLOOD LIPOPROTEIN: CPT

## 2023-12-05 PROCEDURE — 80061 LIPID PANEL: CPT

## 2023-12-05 PROCEDURE — 85025 COMPLETE CBC W/AUTO DIFF WBC: CPT

## 2023-12-05 PROCEDURE — 80053 COMPREHEN METABOLIC PANEL: CPT

## 2024-04-09 ENCOUNTER — HOSPITAL ENCOUNTER (OUTPATIENT)
Dept: HOSPITAL 76 - DI | Age: 69
Discharge: HOME | End: 2024-04-09
Attending: PHYSICIAN ASSISTANT
Payer: MEDICARE

## 2024-04-09 DIAGNOSIS — M85.89: Primary | ICD-10-CM

## 2024-04-09 DIAGNOSIS — Z79.899: ICD-10-CM

## 2024-04-09 NOTE — DEXA REPORT
PROCEDURE: Dexa Spine and/or Hip

 

INDICATIONS: OSTEOPENIA

 

TECHNIQUE: Dual energy x-ray absorptiometry (DXA) was performed on a [employer]emaker[employer] System.  Regions measur
ed are the AP Spine, femoral neck, and if needed forearm.

 

COMPARISON: None

  

FINDINGS:

 

Lumbar Spine: 

Bone Mineral Density: 1.593 g/cm/cm,T score:  3.1.

 

Left Femoral Neck:

Bone Mineral Density: 0.890 g/cm/cm, T score: -1.4. 

 

Left Hip:

Bone Mineral Density: 0.920 g/cm/cm,T score: -1.3.

 

(T score greater or equal to -1.0: NORMAL)

(T score from -1.1 to -2.4: OSTEOPENIA)

(T score less than or equal to -2.5 to: OSTEOPOROSIS)

 

Impression: 

By WHO criteria, this patient has low bone density (osteopenia). 

 

 

Patients with diagnosis of osteoporosis or osteopenia should have regular bone mineral density assess
ment.  For those eligible for Medicare, routine testing is allowed once every 2 years.  Testing frequ
ency can be increased for patients who have rapidly progressing disease or for those who are receivin
g medical therapy to restore bone mass.

 

Reviewed by: Yesi Martinez MD on 4/9/2024 4:02 PM PDT

Approved by: Yesi Martinez MD on 4/9/2024 4:02 PM PDT

 

 

Station ID:  [id number]